# Patient Record
Sex: FEMALE | Race: WHITE | Employment: FULL TIME | URBAN - METROPOLITAN AREA
[De-identification: names, ages, dates, MRNs, and addresses within clinical notes are randomized per-mention and may not be internally consistent; named-entity substitution may affect disease eponyms.]

---

## 2023-07-14 ENCOUNTER — HOSPITAL ENCOUNTER (OUTPATIENT)
Dept: ULTRASOUND IMAGING | Age: 23
Discharge: HOME | End: 2023-07-14
Payer: COMMERCIAL

## 2023-07-14 DIAGNOSIS — N92.6 IRREGULAR MENSES: ICD-10-CM

## 2023-07-14 DIAGNOSIS — R79.89 ABNORMAL THYROID BLOOD TEST: ICD-10-CM

## 2023-07-14 PROCEDURE — 76830 TRANSVAGINAL US NON-OB: CPT

## 2023-07-14 PROCEDURE — 76536 US EXAM OF HEAD AND NECK: CPT

## 2024-12-11 ENCOUNTER — HOSPITAL ENCOUNTER (EMERGENCY)
Age: 24
Discharge: HOME | End: 2024-12-11
Attending: EMERGENCY MEDICINE
Payer: OTHER MISCELLANEOUS

## 2024-12-11 VITALS
SYSTOLIC BLOOD PRESSURE: 135 MMHG | BODY MASS INDEX: 30.29 KG/M2 | TEMPERATURE: 99.3 F | HEIGHT: 64 IN | DIASTOLIC BLOOD PRESSURE: 91 MMHG | HEART RATE: 113 BPM | RESPIRATION RATE: 18 BRPM | OXYGEN SATURATION: 98 % | WEIGHT: 177.4 LBS

## 2024-12-11 DIAGNOSIS — Z3A.13 13 WEEKS GESTATION OF PREGNANCY: ICD-10-CM
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
## 2024-12-11 LAB
ALBUMIN SERPL-MCNC: 3.8 G/DL (ref 3.4–5)
ALBUMIN/GLOB SERPL: 1.3 {RATIO} (ref 1.1–2.2)
ALP SERPL-CCNC: 66 U/L (ref 40–129)
ALT SERPL-CCNC: 11 U/L (ref 10–40)
ANION GAP SERPL CALCULATED.3IONS-SCNC: 13 MMOL/L (ref 3–16)
AST SERPL-CCNC: 17 U/L (ref 15–37)
BASOPHILS # BLD: 0 K/UL (ref 0–0.2)
BASOPHILS NFR BLD: 0.1 %
BILIRUB SERPL-MCNC: <0.2 MG/DL (ref 0–1)
BUN SERPL-MCNC: 6 MG/DL (ref 7–20)
CALCIUM SERPL-MCNC: 9.1 MG/DL (ref 8.3–10.6)
CHLORIDE SERPL-SCNC: 104 MMOL/L (ref 99–110)
CO2 SERPL-SCNC: 21 MMOL/L (ref 21–32)
CREAT SERPL-MCNC: 0.6 MG/DL (ref 0.6–1.1)
DEPRECATED RDW RBC AUTO: 13.2 % (ref 12.4–15.4)
EOSINOPHIL # BLD: 0.1 K/UL (ref 0–0.6)
EOSINOPHIL NFR BLD: 0.4 %
GFR SERPLBLD CREATININE-BSD FMLA CKD-EPI: >90 ML/MIN/{1.73_M2}
GLUCOSE SERPL-MCNC: 106 MG/DL (ref 70–99)
HCT VFR BLD AUTO: 36.6 % (ref 36–48)
HGB BLD-MCNC: 12.2 G/DL (ref 12–16)
LYMPHOCYTES # BLD: 2.1 K/UL (ref 1–5.1)
LYMPHOCYTES NFR BLD: 12.7 %
MAGNESIUM SERPL-MCNC: 1.85 MG/DL (ref 1.8–2.4)
MCH RBC QN AUTO: 30.4 PG (ref 26–34)
MCHC RBC AUTO-ENTMCNC: 33.3 G/DL (ref 31–36)
MCV RBC AUTO: 91.1 FL (ref 80–100)
MONOCYTES # BLD: 0.7 K/UL (ref 0–1.3)
MONOCYTES NFR BLD: 4.3 %
NEUTROPHILS # BLD: 13.9 K/UL (ref 1.7–7.7)
NEUTROPHILS NFR BLD: 82.5 %
PLATELET # BLD AUTO: 418 K/UL (ref 135–450)
PMV BLD AUTO: 7 FL (ref 5–10.5)
POTASSIUM SERPL-SCNC: 3.3 MMOL/L (ref 3.5–5.1)
PROT SERPL-MCNC: 6.8 G/DL (ref 6.4–8.2)
RBC # BLD AUTO: 4.02 M/UL (ref 4–5.2)
SODIUM SERPL-SCNC: 138 MMOL/L (ref 136–145)
WBC # BLD AUTO: 16.8 K/UL (ref 4–11)

## 2024-12-11 PROCEDURE — 99283 EMERGENCY DEPT VISIT LOW MDM: CPT

## 2024-12-11 PROCEDURE — 80053 COMPREHEN METABOLIC PANEL: CPT

## 2024-12-11 PROCEDURE — 85025 COMPLETE CBC W/AUTO DIFF WBC: CPT

## 2024-12-11 PROCEDURE — 83735 ASSAY OF MAGNESIUM: CPT

## 2024-12-11 RX ORDER — BUPROPION HYDROCHLORIDE 300 MG/1
300 TABLET ORAL DAILY
COMMUNITY
Start: 2024-10-02

## 2024-12-11 ASSESSMENT — PAIN SCALES - GENERAL: PAINLEVEL_OUTOF10: 2

## 2024-12-11 ASSESSMENT — PAIN - FUNCTIONAL ASSESSMENT: PAIN_FUNCTIONAL_ASSESSMENT: 0-10

## 2024-12-11 ASSESSMENT — PAIN DESCRIPTION - LOCATION: LOCATION: ABDOMEN
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
## 2025-01-23 ENCOUNTER — TELEPHONE (OUTPATIENT)
Dept: VASCULAR SURGERY | Age: 25
End: 2025-01-23

## 2025-01-23 NOTE — TELEPHONE ENCOUNTER
LVM to r/s appt on 2/7/25 with Dr. Alcocer. Pt can be r/s to 2/6/25 if pt is available in one of the afternoon sclero spots.

## 2025-03-06 ENCOUNTER — OFFICE VISIT (OUTPATIENT)
Dept: VASCULAR SURGERY | Age: 25
End: 2025-03-06
Payer: COMMERCIAL

## 2025-03-06 VITALS
SYSTOLIC BLOOD PRESSURE: 116 MMHG | BODY MASS INDEX: 33.8 KG/M2 | DIASTOLIC BLOOD PRESSURE: 74 MMHG | HEART RATE: 97 BPM | WEIGHT: 198 LBS | HEIGHT: 64 IN | OXYGEN SATURATION: 99 %

## 2025-03-06 DIAGNOSIS — I83.93 SPIDER VEINS OF BOTH LOWER EXTREMITIES: Primary | ICD-10-CM

## 2025-03-06 PROCEDURE — 99202 OFFICE O/P NEW SF 15 MIN: CPT | Performed by: SURGERY

## 2025-03-06 NOTE — PROGRESS NOTES
[facility]        New Patient Screening History and Physical  Date: [unfilled]   Name: Rene Garcia    /74 (Site: Right Upper Arm, Position: Sitting, Cuff Size: Medium Adult)   Pulse 97   Ht 1.626 m (5' 4.02\")   Wt 89.8 kg (198 lb)   SpO2 99%   BMI 33.97 kg/m²     REASON FOR VISIT: LEG(S)      LEG(S)   [] Symptomatic L R Other    L R Other  Pain   [] []     Fatigue [] []      Superficial Phlebitis [] []     Swelling [] []      Aching   [] []     Bleeding [] []      Numbness  [] []     Burning [] []      FUNCTIONAL IMPAIRMENTAFFECTING ADL’S/PT’S ABILITYTO WORK[]YES[]NO.  IF YES, DESCRIBE                                  [x] Cosmetic    PHYSICAL EXMINATION:  LEG(S)    FOR VARICOSE VEINS:  LESION/VEIN TYPE  L R FACE OTHER  L LEG: [] 4-7 mm  [] 8-10 mm[]>10mm  Varicose Vein   [] [] []    R LEG: [] 4-7 mm [] 8-10 mm[] >10mm  Reticular Vein (2mm-5mm) [] [] []   Other:        Spider Vein (0.2-1mm) [x] [x] []    [] 4-7 mm [] 8-10 mm [] >10mm  [] Port-Wine Stain      []Hemangioma        LOCATION:       LOCATION:        []  <10.0 cm2       SIZE:         [] 10.0 - 50 cm2   [] >50.0 cm2   NOTES:                                 PREVIOUS TREATMENT   LEG(S)  [x] NO [] YES  IF YES:  L R FACE OTHER      Sclerotherapy  [] [] []   Compression Hose []     Surgery  [] []    Pain Medication []      Laser   [] [] []   Elevation  []     Photoderm  [] []    Exercise  []     Other:                               RECOMMENDATIONS: LEG(S)      LEG(S)     L R OTHER    L R FACE    OTHER  SCLEROTHERAPY [x] [x] 1=3 tx        LASER/LIGHT TX [] [] []     DUPLEX  [] []   SURGERY  [] []  COMPRESSION HOSE []  NO FOLLOW UP []  DO NOT CALL  []  OTHER:                                   COMMENTS/NOTES: Agreeable. Will plan to return for treatment after current pregnancy (25 wks gravid)       Chandler Alcocer MD